# Patient Record
Sex: FEMALE
[De-identification: names, ages, dates, MRNs, and addresses within clinical notes are randomized per-mention and may not be internally consistent; named-entity substitution may affect disease eponyms.]

---

## 2019-01-16 PROBLEM — Z00.00 ENCOUNTER FOR PREVENTIVE HEALTH EXAMINATION: Status: ACTIVE | Noted: 2019-01-16

## 2019-02-04 ENCOUNTER — APPOINTMENT (OUTPATIENT)
Dept: ORTHOPEDIC SURGERY | Facility: CLINIC | Age: 54
End: 2019-02-04
Payer: COMMERCIAL

## 2019-02-04 VITALS
SYSTOLIC BLOOD PRESSURE: 146 MMHG | WEIGHT: 150 LBS | TEMPERATURE: 98.6 F | HEIGHT: 65 IN | HEART RATE: 75 BPM | DIASTOLIC BLOOD PRESSURE: 96 MMHG | BODY MASS INDEX: 24.99 KG/M2

## 2019-02-04 DIAGNOSIS — M75.50 BURSITIS OF UNSPECIFIED SHOULDER: ICD-10-CM

## 2019-02-04 DIAGNOSIS — M75.32 CALCIFIC TENDINITIS OF LEFT SHOULDER: ICD-10-CM

## 2019-02-04 PROCEDURE — 73030 X-RAY EXAM OF SHOULDER: CPT | Mod: LT

## 2019-02-04 PROCEDURE — 99204 OFFICE O/P NEW MOD 45 MIN: CPT | Mod: 25

## 2019-02-04 PROCEDURE — 20610 DRAIN/INJ JOINT/BURSA W/O US: CPT | Mod: LT

## 2019-02-04 NOTE — PROCEDURE
[de-identified] : Injection: Left shoulder (Subacromial).\par Indication: Calcific tendinitis.\par \par A discussion was had with the patient regarding this procedure and all questions were answered. All risks, benefits and alternatives were discussed. These included but were not limited to bleeding, infection, and allergic reaction. Alcohol was used to clean the skin, and betadine was used to sterilize and prep the area in the posterior aspect of the left shoulder. Ethyl chloride spray was then used as a topical anesthetic. A 21-gauge needle was used to inject 4cc of 1% lidocaine and 1cc of 40mg/ml methylprednisolone into the left subacromial space. A sterile bandage was then applied. The patient tolerated the procedure well and there were no complications.

## 2019-02-04 NOTE — DISCUSSION/SUMMARY
[de-identified] : The patient presents today with acute onset of left shoulder pain consistent with a clinical diagnosis of calcific tendinopathy. Radiographs show a calcification at the rotator cuff insertion. Clinically, the patient specific point tenderness to this location as well as positive impingement signs. I discussed the pathophysiology of the diagnosis and the distinction between the resorptive and formative phase.  I discussed the treatment of calcific tendinitis with the patient at length today and the inflammatory process it incites during resorptive phase.\par \par I discussed the treatment of calcific tendinitis with the patient including nonoperative management as first line treatment. Anti-inflammatory medications (NSAID's) with physical therapy for strengthening and conditioning of the joint to preserve shoulder range of motion is typically required. Corticosteroid injection may be indicated for refractory cases and for acute symptomatic pain relief. If nonoperative management fails, arthroscopic debridement with possible rotator cuff repair if damaged during surgery may be required for recalcitrant cases.\par \par The patient elected to proceed with nonoperative management including cortisone injection and a home exercise program. We will followup in 4-6 weeks as needed.

## 2019-02-04 NOTE — CONSULT LETTER
[Dear  ___] : Dear  [unfilled], [Consult Letter:] : I had the pleasure of evaluating your patient, [unfilled]. [Please see my note below.] : Please see my note below. [Consult Closing:] : Thank you very much for allowing me to participate in the care of this patient.  If you have any questions, please do not hesitate to contact me. [Sincerely,] : Sincerely, [FreeTextEntry2] : Bridger Lopez MD [FreeTextEntry3] : Anish Antony, DO\par Orthopaedic Surgery- Sports Medicine\par [facility]\par [address], [address]\[place] [address]\par Ph: [phone]\par

## 2019-02-04 NOTE — REVIEW OF SYSTEMS
[Joint Stiffness] : joint stiffness [Joint Swelling] : joint swelling [Muscle Weakness] : muscle weakness [Negative] : Heme/Lymph [Joint Pain] : no joint pain [FreeTextEntry9] : left shoulder

## 2019-02-04 NOTE — PHYSICAL EXAM
[de-identified] : Physical Exam:\par General: Well appearing, no acute distress, A&O\par Neurologic: A&Ox3, No focal deficits\par Head: NCAT without abrasions, lacerations, or ecchymosis to head, face, or scalp\par Eyes: No scleral icterus, no gross abnormalities\par Respiratory: Equal chest wall expansion bilaterally, no accessory muscle use\par Lymphatic: No lymphadenopathy palpated\par Skin: Warm and dry\par Psychiatric: Normal mood and affect\par Left Shoulder\par ·	Inspection/Palpation: no tenderness, swelling or deformities\par ·	Range of Motion: no crepitus with ROM; Active ; ER at side 45; IR to L1; \par ·	Strength: forward elevation in scapular plane [4/5], internal rotation [4/5], external rotation [4/5], adduction [4/5] and abduction [4/5]\par ·	Stability: no joint instability on provocative testing\par ·	Tests: Alfonso test positive, Neer positive, positive drop arm test secondary to pain, bear hug test positive, Napolean sign negative, cross arm adduction negative, lift off sign positive, hornblowers sign negative, speeds test negative, Yergason's test negative, no bicipital groove tenderness, Tay's Active Compression test negative, whipple test negative, bicep's load II test negative\par \par Right Shoulder\par ·	Inspection/Palpation: no tenderness, swelling or deformities\par ·	Range of Motion: full and painless in all planes, no crepitus\par ·	Strength: forward elevation in scapular plane 5/5, internal rotation 5/5, external rotation 5/5, adduction 5/5 and abduction 5/5\par ·	Stability: no joint instability on provocative testing\par ·	Tests: Alfonso test negative, Neer sign negative, negative drop arm test secondary to pain, bear hug test negative, Napolean sign negative, cross arm adduction negative, lift off sign positive, hornblowers sign negative, speeds test negative, Yergason's test negative, no bicipital groove tenderness, Tay's Active Compression test negative\par  [de-identified] : X-rays of the left shoulder were performed today and available for me to review. 4 vies of the left shoulder demonstrate no fracture or dislocation. [No] glenohumeral degenerative changes noted. [No] AC joint degenerative changes noted. No gross deformities noted. Calcium deposit noted at the supraspinatus insertion

## 2019-02-04 NOTE — HISTORY OF PRESENT ILLNESS
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 Pt. presents with L shoulder pain and swelling that began approximately 2 months ago.  She has hx. of vertigo episode 12/7/18 and went to [facility].  She had blood work and an IV at the hospital.  Pain is worse in the morning after lying on it, and with sitting with the arm unsupported.  Her PCP, Dr. Lopez tried ordering an MRI but was denied by insurance co.  He suggested she see an orthopedist.  She feels like she has a lump and swelling in her deltoid that radiates down her arm.  Feelings of weakness.  No numbness or tingling.

## 2021-09-02 ENCOUNTER — TRANSCRIPTION ENCOUNTER (OUTPATIENT)
Age: 56
End: 2021-09-02